# Patient Record
Sex: MALE | Employment: FULL TIME | URBAN - METROPOLITAN AREA
[De-identification: names, ages, dates, MRNs, and addresses within clinical notes are randomized per-mention and may not be internally consistent; named-entity substitution may affect disease eponyms.]

---

## 2017-07-23 ENCOUNTER — OFFICE VISIT (OUTPATIENT)
Dept: FAMILY MEDICINE CLINIC | Facility: CLINIC | Age: 42
End: 2017-07-23

## 2017-07-23 VITALS
WEIGHT: 190 LBS | DIASTOLIC BLOOD PRESSURE: 70 MMHG | SYSTOLIC BLOOD PRESSURE: 104 MMHG | TEMPERATURE: 98 F | HEART RATE: 63 BPM | OXYGEN SATURATION: 98 % | BODY MASS INDEX: 27 KG/M2

## 2017-07-23 DIAGNOSIS — Z87.39 PERSONAL HISTORY OF GOUT: ICD-10-CM

## 2017-07-23 DIAGNOSIS — M79.672 LEFT FOOT PAIN: Primary | ICD-10-CM

## 2017-07-23 PROCEDURE — 99213 OFFICE O/P EST LOW 20 MIN: CPT | Performed by: PHYSICIAN ASSISTANT

## 2017-07-23 RX ORDER — METHYLPREDNISOLONE 4 MG/1
TABLET ORAL
Qty: 1 PACKAGE | Refills: 0 | Status: SHIPPED | OUTPATIENT
Start: 2017-07-23

## 2017-07-23 NOTE — PATIENT INSTRUCTIONS
We will cover for inflammation/gout with medrol. If your symptoms are not improving by tomorrow, then see your PCP on Monday or go to the [facility] for an xray BEFORE you leave town on Tuesday.       Elevate foot above heart  Rest  Ice x 20 minutes ev

## 2017-07-23 NOTE — PROGRESS NOTES
CHIEF COMPLAINT:     Patient presents with: Foot Pain      HPI:   Yara Schroeder is a 36year old male  who presents with complaints of left foot pain for 2 days. No fevers/chills. No injury. Hx of gout and bursitis.   He is on uloric, taking it Able to bend toe without significant problem. Tender to light touch. Dorsalis pedis and posterior tibialis pulses intact and equal bilaterally. Sensation intact. NEURO: Patellar and achilles DTR's intact and equal bilaterally.     ASSESSMENT AND PLAN: for an xray BEFORE you leave town on Tuesday. Elevate foot above heart  Rest  Ice x 20 minutes every few hours  Go to the ER if your symptoms worsen in any way. The patient indicates understanding of these issues and agrees to the plan.   The